# Patient Record
Sex: FEMALE | Race: WHITE | URBAN - METROPOLITAN AREA
[De-identification: names, ages, dates, MRNs, and addresses within clinical notes are randomized per-mention and may not be internally consistent; named-entity substitution may affect disease eponyms.]

---

## 2024-07-26 ENCOUNTER — TELEPHONE (OUTPATIENT)
Dept: PRIMARY CARE CLINIC | Age: 44
End: 2024-07-26

## 2024-07-26 ENCOUNTER — OFFICE VISIT (OUTPATIENT)
Dept: PRIMARY CARE CLINIC | Age: 44
End: 2024-07-26
Payer: COMMERCIAL

## 2024-07-26 VITALS
WEIGHT: 236 LBS | TEMPERATURE: 99.3 F | BODY MASS INDEX: 37.93 KG/M2 | DIASTOLIC BLOOD PRESSURE: 78 MMHG | SYSTOLIC BLOOD PRESSURE: 118 MMHG | HEART RATE: 82 BPM | OXYGEN SATURATION: 97 % | HEIGHT: 66 IN | RESPIRATION RATE: 19 BRPM

## 2024-07-26 DIAGNOSIS — Z23 IMMUNIZATION DUE: ICD-10-CM

## 2024-07-26 DIAGNOSIS — Z12.31 ENCOUNTER FOR SCREENING MAMMOGRAM FOR MALIGNANT NEOPLASM OF BREAST: ICD-10-CM

## 2024-07-26 DIAGNOSIS — Z11.4 ENCOUNTER FOR SCREENING FOR HIV: ICD-10-CM

## 2024-07-26 DIAGNOSIS — K31.84 GASTROPARESIS: ICD-10-CM

## 2024-07-26 DIAGNOSIS — J30.9 ALLERGIC RHINITIS, UNSPECIFIED SEASONALITY, UNSPECIFIED TRIGGER: ICD-10-CM

## 2024-07-26 DIAGNOSIS — Z11.59 NEED FOR HEPATITIS B SCREENING TEST: ICD-10-CM

## 2024-07-26 DIAGNOSIS — Z00.00 HEALTHCARE MAINTENANCE: Primary | ICD-10-CM

## 2024-07-26 DIAGNOSIS — Z11.59 ENCOUNTER FOR HEPATITIS C SCREENING TEST FOR LOW RISK PATIENT: ICD-10-CM

## 2024-07-26 DIAGNOSIS — N92.6 ENCOUNTER FOR MANAGEMENT OF MENSTRUAL ISSUE: ICD-10-CM

## 2024-07-26 DIAGNOSIS — E11.69 TYPE 2 DIABETES MELLITUS WITH OTHER SPECIFIED COMPLICATION, WITHOUT LONG-TERM CURRENT USE OF INSULIN (HCC): Primary | ICD-10-CM

## 2024-07-26 DIAGNOSIS — E11.9 TYPE 2 DIABETES MELLITUS WITHOUT COMPLICATION, WITHOUT LONG-TERM CURRENT USE OF INSULIN (HCC): ICD-10-CM

## 2024-07-26 PROCEDURE — 99386 PREV VISIT NEW AGE 40-64: CPT | Performed by: FAMILY MEDICINE

## 2024-07-26 PROCEDURE — 90715 TDAP VACCINE 7 YRS/> IM: CPT | Performed by: FAMILY MEDICINE

## 2024-07-26 PROCEDURE — 90471 IMMUNIZATION ADMIN: CPT | Performed by: FAMILY MEDICINE

## 2024-07-26 RX ORDER — PRUCALOPRIDE 2 MG/1
1 TABLET, FILM COATED ORAL DAILY
COMMUNITY
End: 2024-07-26 | Stop reason: SDUPTHER

## 2024-07-26 RX ORDER — LISINOPRIL 2.5 MG/1
2.5 TABLET ORAL DAILY
COMMUNITY
End: 2024-07-26 | Stop reason: SDUPTHER

## 2024-07-26 RX ORDER — DAPAGLIFLOZIN 10 MG/1
10 TABLET, FILM COATED ORAL EVERY MORNING
COMMUNITY
End: 2024-07-26 | Stop reason: SDUPTHER

## 2024-07-26 RX ORDER — PRUCALOPRIDE 2 MG/1
1 TABLET, FILM COATED ORAL DAILY
Qty: 90 TABLET | Refills: 0 | Status: SHIPPED | OUTPATIENT
Start: 2024-07-26 | End: 2024-10-24

## 2024-07-26 RX ORDER — DAPAGLIFLOZIN 10 MG/1
10 TABLET, FILM COATED ORAL EVERY MORNING
Qty: 90 TABLET | Refills: 0 | Status: SHIPPED | OUTPATIENT
Start: 2024-07-26

## 2024-07-26 RX ORDER — FEXOFENADINE HCL 60 MG/1
60 TABLET, FILM COATED ORAL DAILY
COMMUNITY
End: 2024-07-26 | Stop reason: SDUPTHER

## 2024-07-26 RX ORDER — LISINOPRIL 2.5 MG/1
2.5 TABLET ORAL DAILY
Qty: 90 TABLET | Refills: 0 | Status: SHIPPED | OUTPATIENT
Start: 2024-07-26

## 2024-07-26 RX ORDER — FEXOFENADINE HCL 60 MG/1
60 TABLET, FILM COATED ORAL DAILY
Qty: 90 TABLET | Refills: 0 | Status: SHIPPED | OUTPATIENT
Start: 2024-07-26 | End: 2024-10-24

## 2024-07-26 ASSESSMENT — ENCOUNTER SYMPTOMS
VOMITING: 0
BLOOD IN STOOL: 0
COUGH: 0
RHINORRHEA: 0
ABDOMINAL PAIN: 0
SHORTNESS OF BREATH: 0
DIARRHEA: 0
NAUSEA: 0
COLOR CHANGE: 0

## 2024-07-26 NOTE — TELEPHONE ENCOUNTER
Pt called stating she forgot to ask you for refills on all her medications.       metFORMIN (GLUCOPHAGE) 1000 MG tablet      dapagliflozin (FARXIGA) 10 MG tablet     lisinopril (PRINIVIL;ZESTRIL) 2.5 MG tablet     fexofenadine (ALLEGRA ALLERGY) 60 MG tablet     Prucalopride Succinate (MOTEGRITY) 2 MG TABS

## 2024-07-26 NOTE — ASSESSMENT & PLAN NOTE
Overall doing well. Counseled briefly and provided written materials on recommendations for exercise and healthy diet. Age appropriate screenings and immunization provided as per orders below. Reminded to schedule dental exam when able. Other problems addressed today as otherwise noted.

## 2024-07-26 NOTE — PATIENT INSTRUCTIONS
For help and support with [employer] reggie/portal set-up, please call [phone].     Zevia soda.    Reminder: Please call to schedule dental exam when able.    Lifestyle modifications discussed today:    Exercise:    In accordance with AHA/ACC guidelines:    - Get at least 150 minutes per week of moderate-intensity aerobic activity OR 75 minutes per week of vigorous aerobic activity, OR a combination of both, preferably spread throughout the week.    - Add moderate- to high-intensity muscle-strengthening activity (such as resistance or weights) on at least 2 days per week.    - Gain even more benefits by being active at least 300 minutes (5 hours) per week.    Increase amount and intensity gradually over time.    Diet:    Dietary pattern should consist of vegetables, fruits and whole grains, low-fat dairy (or no dairy if intolerant), suggested poultry and fish. Consider legumes, vegetable oils and nuts and limiting intake of sweets, sugar, sweetened beverages and red meat.    Aim is to have 5% of calories from saturated fats or less and no trans fat.

## 2024-07-26 NOTE — ASSESSMENT & PLAN NOTE
Well controlled. Please see plan and goals below:    Goals   [x] Glycemic control: A1c 7% (consider < 6.5% if long life expectancy, 7-8% if hypoglycemia risk or comorbidities, or 8-9% if poor life expectancy)  [x] Blood pressure control: BP < 140/90 (consider < 130/80 if high risk)  [x] Metformin at 2g/d  [ ] Moderate-high intensity statin (if ASCVD, or age > 40) - declined - counseled today  [ ] Smoking cessation - N/A     Labs  -Last HbA1c 6.4% today, next due Dec 2024 (q 3 months, or longer if at goal)  -Microalbumin due now and ordered (annually)  -Cr or eGFR due now and ordered (annually)  -Lipids due now and ordered (consider annually)  -LFT's due now and ordered (consider annually)  -B12 due can consider at Dec visit (annually if anemia or neuropathy or on Metformin)  -TSH due N/A (annually if T1DM)     Preventive Measures   [x] Diabetes education teaching (all new diagnoses)  [ ] Optometry/Ophthalmology referral (all pts w DM) - scheduled 8/8 at [facility], will send signed FRANCIE then - reminder set  [ ] PCV 20(all pts w DM) - declined today - counseled on this  [ ] Hep B 2-dose (all pts w DM if age < 60, risk/benefit discussion if age >60) - will test for immunity    [ ] dose 1    [ ] dose 2 four weeks after dose 1  [ ] Baby aspirin (if ASCVD, or age > 50) - will check lipids as above  [x] ACE/ARB (if CKD, or albumin-creatinine-ratio > 30)  -Annual comprehensive foot exam - Done today  -Annual eye exam - As above  -Annual flu shot - Not in season

## 2024-07-26 NOTE — TELEPHONE ENCOUNTER
Pt called stating her former GI was Dr Crawford  at [facility].   [phone]

## 2024-07-26 NOTE — PROGRESS NOTES
wheezing, rhonchi or rales.   Chest:      Chest wall: No tenderness.   Abdominal:      Palpations: Abdomen is soft.   Skin:     General: Skin is warm and dry.      Capillary Refill: Capillary refill takes less than 2 seconds.   Neurological:      Mental Status: She is alert.   Psychiatric:         Mood and Affect: Mood normal.         Behavior: Behavior normal.       Diabetic foot exam:   Left Foot:   Visual Exam: normal   Pulse DP: 2+ (normal)   Filament test: normal sensation     Right Foot:   Visual Exam: normal   Pulse DP: 2+ (normal)   Filament test: normal sensation     Body mass index is 38.38 kg/m².    Labs:  No results found for this or any previous visit (from the past 672 hour(s)).    Imaging:  ABHAY DIGITAL SCREEN W OR WO CAD BILATERAL    (Results Pending)     ASSESSMENT & PLAN:    Tatianna Calloway is a 44 y.o. year old female here for an annual exam. The following is a summary of the plan made at this visit:    1. Healthcare maintenance  Assessment & Plan:  Overall doing well. Counseled briefly and provided written materials on recommendations for exercise and healthy diet. Age appropriate screenings and immunization provided as per orders below. Reminded to schedule dental exam when able. Other problems addressed today as otherwise noted.    2. Encounter for screening mammogram for malignant neoplasm of breast  Assessment & Plan:  Age appropriate screening provided as per orders below.  Orders:  -     ABHAY DIGITAL SCREEN W OR WO CAD BILATERAL; Future  3. Encounter for hepatitis C screening test for low risk patient  Assessment & Plan:  Age appropriate screening provided as per orders below.  Orders:  -     Hepatitis C Antibody; Future  4. Encounter for screening for HIV  Assessment & Plan:  Age appropriate screening provided as per orders below.  Orders:  -     HIV Screen; Future  5. Need for hepatitis B screening test  Assessment & Plan:  Will check for immunity and offer Hepatitis B vaccine as

## 2024-08-02 DIAGNOSIS — Z11.59 NEED FOR HEPATITIS B SCREENING TEST: ICD-10-CM

## 2024-08-02 DIAGNOSIS — E11.9 TYPE 2 DIABETES MELLITUS WITHOUT COMPLICATION, WITHOUT LONG-TERM CURRENT USE OF INSULIN (HCC): ICD-10-CM

## 2024-08-02 DIAGNOSIS — Z11.59 ENCOUNTER FOR HEPATITIS C SCREENING TEST FOR LOW RISK PATIENT: ICD-10-CM

## 2024-08-02 DIAGNOSIS — Z11.4 ENCOUNTER FOR SCREENING FOR HIV: ICD-10-CM

## 2024-08-02 LAB
ALBUMIN SERPL-MCNC: 4.1 G/DL (ref 3.4–5)
ALBUMIN/GLOB SERPL: 1.3 {RATIO} (ref 1.1–2.2)
ALP SERPL-CCNC: 84 U/L (ref 40–129)
ALT SERPL-CCNC: 20 U/L (ref 10–40)
ANION GAP SERPL CALCULATED.3IONS-SCNC: 18 MMOL/L (ref 3–16)
AST SERPL-CCNC: 16 U/L (ref 15–37)
BILIRUB SERPL-MCNC: 0.3 MG/DL (ref 0–1)
BUN SERPL-MCNC: 10 MG/DL (ref 7–20)
CALCIUM SERPL-MCNC: 9.5 MG/DL (ref 8.3–10.6)
CHLORIDE SERPL-SCNC: 98 MMOL/L (ref 99–110)
CHOLEST SERPL-MCNC: 161 MG/DL (ref 0–199)
CO2 SERPL-SCNC: 21 MMOL/L (ref 21–32)
CREAT SERPL-MCNC: 0.7 MG/DL (ref 0.6–1.1)
CREAT UR-MCNC: 109.9 MG/DL (ref 28–259)
GFR SERPLBLD CREATININE-BSD FMLA CKD-EPI: >90 ML/MIN/{1.73_M2}
GLUCOSE SERPL-MCNC: 135 MG/DL (ref 70–99)
HBV SURFACE AB SERPL IA-ACNC: 6.75 MIU/ML
HCV AB SERPL QL IA: NORMAL
HDLC SERPL-MCNC: 73 MG/DL (ref 40–60)
LDLC SERPL CALC-MCNC: 72 MG/DL
MICROALBUMIN UR DL<=1MG/L-MCNC: <1.2 MG/DL
MICROALBUMIN/CREAT UR: NORMAL MG/G (ref 0–30)
POTASSIUM SERPL-SCNC: 4.2 MMOL/L (ref 3.5–5.1)
PROT SERPL-MCNC: 7.2 G/DL (ref 6.4–8.2)
SODIUM SERPL-SCNC: 137 MMOL/L (ref 136–145)
TRIGL SERPL-MCNC: 79 MG/DL (ref 0–150)
VLDLC SERPL CALC-MCNC: 16 MG/DL

## 2024-08-03 LAB
HIV 1+2 AB+HIV1 P24 AG SERPL QL IA: NORMAL
HIV 2 AB SERPL QL IA: NORMAL
HIV1 AB SERPL QL IA: NORMAL
HIV1 P24 AG SERPL QL IA: NORMAL

## 2024-08-05 ENCOUNTER — TELEPHONE (OUTPATIENT)
Dept: FAMILY MEDICINE CLINIC | Age: 44
End: 2024-08-05

## 2024-08-05 NOTE — TELEPHONE ENCOUNTER
SUBMITTED PA FOR Motegrity 2MG tablets  VIA CMM Key: [id number] STATUS NOT SENT TO PLAN. PLEASE PROVIDE A DX CODE FOR THE MEDICATION BEING REQUESTED.      If this requires a response please respond to the pool ( P MHCX PSC MEDICATION PRE-AUTH).      Thank you please advise patient.     FOLLOW UP DONE DAILY: IF NO RESPONSE IN 3 DAYS WE WILL REFAX FOR STATUS CHECK. IF ANOTHER 3 DAYS GOES BY WITH NO RESPONSE WILL CALL INSURANCE FOR STATUS.

## 2024-08-06 NOTE — TELEPHONE ENCOUNTER
SENT TO PLAN      If this requires a response please respond to the pool ( P MHCX PSC MEDICATION PRE-AUTH).      Thank you please advise patient.

## 2024-08-08 ENCOUNTER — TELEPHONE (OUTPATIENT)
Dept: PRIMARY CARE CLINIC | Age: 44
End: 2024-08-08

## 2024-08-08 NOTE — TELEPHONE ENCOUNTER
Called [facility] and spoke to Nannette Brunson said that the PA for Motegrity is still under review.  [facility] can take up to 15 days to complete a review.

## 2024-08-14 ENCOUNTER — OFFICE VISIT (OUTPATIENT)
Dept: GYNECOLOGY | Age: 44
End: 2024-08-14

## 2024-08-14 VITALS — SYSTOLIC BLOOD PRESSURE: 131 MMHG | HEART RATE: 88 BPM | OXYGEN SATURATION: 98 % | DIASTOLIC BLOOD PRESSURE: 90 MMHG

## 2024-08-14 DIAGNOSIS — N76.0 BV (BACTERIAL VAGINOSIS): ICD-10-CM

## 2024-08-14 DIAGNOSIS — B96.89 BV (BACTERIAL VAGINOSIS): ICD-10-CM

## 2024-08-14 DIAGNOSIS — N89.8 VAGINAL DISCHARGE: ICD-10-CM

## 2024-08-14 DIAGNOSIS — Z97.5 PRESENCE OF 52 MG LEVONORGESTREL-RELEASING INTRAUTERINE DEVICE (IUD): ICD-10-CM

## 2024-08-14 DIAGNOSIS — N92.6 CATAMENIAL DISORDER: ICD-10-CM

## 2024-08-14 DIAGNOSIS — Z01.419 WELL WOMAN EXAM WITH ROUTINE GYNECOLOGICAL EXAM: Primary | ICD-10-CM

## 2024-08-14 RX ORDER — PROMETHAZINE HYDROCHLORIDE 25 MG/1
TABLET ORAL
Qty: 2 TABLET | Refills: 0 | Status: SHIPPED | OUTPATIENT
Start: 2024-08-14

## 2024-08-14 RX ORDER — OXYCODONE HYDROCHLORIDE AND ACETAMINOPHEN 5; 325 MG/1; MG/1
2 TABLET ORAL ONCE
Qty: 2 TABLET | Refills: 0 | Status: SHIPPED | OUTPATIENT
Start: 2024-08-14 | End: 2024-08-14

## 2024-08-14 RX ORDER — ALPRAZOLAM 1 MG/1
TABLET ORAL
Qty: 1 TABLET | Refills: 0 | Status: SHIPPED | OUTPATIENT
Start: 2024-08-14 | End: 2024-08-14

## 2024-08-14 NOTE — TELEPHONE ENCOUNTER
Called and spoke with pt about the prior auth denial.  Pt sts she is not surprised about the denial and sts every insurance she has had denies it and the doctor has to do an appeal and either speak with someone at the insurance or write a letter of necessity.  Pt sts they usually deny her due to thinking she has IBS and not gastroparesis.  Pt sts she has tried Linzess in the past and another medication in that same class that she is unable to remember.  Pt sts those medications did not help and actually made her symptoms worse.  Pt sts she would appreciate if we could do an appeal and state that she has been on Motgerity for about 4 years and it is the only medication that has helped her gastroparesis.

## 2024-08-14 NOTE — PROGRESS NOTES
SUBJECTIVE:  Tatianna Calloway is an 44 y.o. year old woman who presents for annual gyn exam.    Has Mirena IUD.  It 6-7 years old.  She started to have spotting with it and like to get a new one.  She is using it for menstrual management.  She has been doing well with it.  We discussed removal and replacement.  Risks discussed including use and insertion.  Discussed placement with oral sedation.  Patient notes last time she had to have it removed it was uncomfortable so she is interested in some oral sedation.  Oral sedation was explained to her and that she would need to have someone she trusts take her to and from her appointment and that she would plan on napping afterwards.  She voices understanding and would like this.  Prescription will be sent today.    REVIEW OF SYSTEMS:  No complaints of symptoms involving:  Constitutional: there has been no unanticipated weight loss. There's been no change in activity level. Negative for fever, chills.  Eyes: No visual changes, double vision, or scotomata. No scleral icterus.  HENT: No Headaches, hearing loss or vertigo. No sore throat, ear pain or nasal congestion  Respiratory: no cough or wheezing, no sputum production, no hemoptysis.    Gastrointestinal: No abdominal pain, appetite loss, blood in stools. No change in bowel habits.  Genitourinary: No dysuria, trouble voiding, or hematuria. No change in bladder habits.  Musculoskeletal:  No gait disturbance,no weakness or joint complaints.  Skin: No rash or pruritis.  Neurological: No headache, vision changes, change in muscle strength, numbness or tingling. No change in gait, balance, coordination.  Psychiatric: No anxiety, or depression. No change in mood or behavior.  Endocrine: No temperature intolerance. No excessive thirst, fluid intake, or urination. No tremor.  Hematologic/Lymphatic: No abnormal bruising or bleeding, blood clots or swollen lymph nodes.       Patient Active Problem List   Diagnosis    Diabetes

## 2024-08-15 ENCOUNTER — LAB (OUTPATIENT)
Dept: PRIMARY CARE CLINIC | Age: 44
End: 2024-08-15

## 2024-08-15 VITALS — SYSTOLIC BLOOD PRESSURE: 130 MMHG | DIASTOLIC BLOOD PRESSURE: 80 MMHG

## 2024-08-15 LAB
CANDIDA DNA VAG QL NAA+PROBE: ABNORMAL
G VAGINALIS DNA SPEC QL NAA+PROBE: ABNORMAL
T VAGINALIS DNA VAG QL NAA+PROBE: ABNORMAL

## 2024-08-15 NOTE — TELEPHONE ENCOUNTER
IS THERE ANY ADDED INFORMATION THE DOCTOR WOULD LIKE TO ADD FOR THE APPEAL LETTER    If this requires a response please respond to the pool ( P MHCX PSC MEDICATION PRE-AUTH).      Thank you please advise patient.

## 2024-08-16 LAB
C TRACH DNA CVX QL NAA+PROBE: NEGATIVE
HPV HR 12 DNA SPEC QL NAA+PROBE: NOT DETECTED
HPV16 DNA SPEC QL NAA+PROBE: NOT DETECTED
HPV16+18+H RISK 12 DNA SPEC-IMP: NORMAL
HPV18 DNA SPEC QL NAA+PROBE: NOT DETECTED
N GONORRHOEA DNA SPEC QL NAA+PROBE: NEGATIVE

## 2024-08-16 RX ORDER — METRONIDAZOLE 500 MG/1
500 TABLET ORAL 2 TIMES DAILY
Qty: 14 TABLET | Refills: 0 | Status: SHIPPED | OUTPATIENT
Start: 2024-08-16 | End: 2024-08-23

## 2024-08-17 ENCOUNTER — HOSPITAL ENCOUNTER (OUTPATIENT)
Dept: MAMMOGRAPHY | Age: 44
Discharge: HOME | End: 2024-08-17
Attending: FAMILY MEDICINE
Payer: COMMERCIAL

## 2024-08-17 VITALS — BODY MASS INDEX: 37.93 KG/M2 | WEIGHT: 236 LBS | HEIGHT: 66 IN

## 2024-08-17 DIAGNOSIS — Z12.31 ENCOUNTER FOR SCREENING MAMMOGRAM FOR MALIGNANT NEOPLASM OF BREAST: ICD-10-CM

## 2024-08-17 PROCEDURE — 77063 BREAST TOMOSYNTHESIS BI: CPT

## 2024-08-20 NOTE — TELEPHONE ENCOUNTER
Noted    If this requires a response please respond to the pool ( P MHCX PSC MEDICATION PRE-AUTH).      Thank you please advise patient.

## 2024-08-25 PROBLEM — Z11.59 NEED FOR HEPATITIS B SCREENING TEST: Status: RESOLVED | Noted: 2024-07-26 | Resolved: 2024-08-25

## 2024-08-25 PROBLEM — Z11.59 ENCOUNTER FOR HEPATITIS C SCREENING TEST FOR LOW RISK PATIENT: Status: RESOLVED | Noted: 2024-07-26 | Resolved: 2024-08-25

## 2024-08-25 PROBLEM — Z00.00 HEALTHCARE MAINTENANCE: Status: RESOLVED | Noted: 2024-07-26 | Resolved: 2024-08-25

## 2024-08-25 PROBLEM — Z11.4 ENCOUNTER FOR SCREENING FOR HIV: Status: RESOLVED | Noted: 2024-07-26 | Resolved: 2024-08-25

## 2024-08-25 PROBLEM — Z12.31 ENCOUNTER FOR SCREENING MAMMOGRAM FOR MALIGNANT NEOPLASM OF BREAST: Status: RESOLVED | Noted: 2024-07-26 | Resolved: 2024-08-25

## 2024-08-29 ENCOUNTER — TELEPHONE (OUTPATIENT)
Dept: PRIMARY CARE CLINIC | Age: 44
End: 2024-08-29

## 2024-08-29 NOTE — TELEPHONE ENCOUNTER
[facility]     ----- Message from Dr. Rola Llanos MD sent at 8/29/2024  1:02 PM EDT -----  Routing to  to let the patient know her mammogram was normal.

## 2024-09-03 ENCOUNTER — PATIENT MESSAGE (OUTPATIENT)
Dept: GYNECOLOGY | Age: 44
End: 2024-09-03

## 2024-09-03 RX ORDER — FLUCONAZOLE 150 MG/1
TABLET ORAL
Qty: 2 TABLET | Refills: 0 | Status: SHIPPED | OUTPATIENT
Start: 2024-09-03

## 2024-09-03 NOTE — TELEPHONE ENCOUNTER
Future Appointments   Date Time Provider Department Center   12/4/2024  9:00 AM Rola Llanos MD [facility]   7/29/2025  9:00 AM Rola Llanos MD [facility]   [facility] 8/14/2024

## 2024-09-03 NOTE — TELEPHONE ENCOUNTER
The medication is APPROVED. LETTER AVAILABLE IN MEDIA  APPROVED 08/19/2024- 08/19/2025    If this requires a response please respond to the pool ( P MHCX PSC MEDICATION PRE-AUTH).      Thank you please advise patient.

## 2024-09-04 ENCOUNTER — TELEPHONE (OUTPATIENT)
Dept: PRIMARY CARE CLINIC | Age: 44
End: 2024-09-04

## 2024-09-10 ENCOUNTER — TELEPHONE (OUTPATIENT)
Dept: PRIMARY CARE CLINIC | Age: 44
End: 2024-09-10

## 2024-10-23 ENCOUNTER — TELEPHONE (OUTPATIENT)
Dept: GYNECOLOGY | Age: 44
End: 2024-10-23

## 2024-10-23 NOTE — TELEPHONE ENCOUNTER
Mirena IUD has been ordered to the [place] location. Pharmacy used is [facility]. They can be reached at [phone] or the direct birth control line is [phone]. Creating telephone encounter to track order. Order was scanned into the appt.

## 2024-11-06 ENCOUNTER — TELEPHONE (OUTPATIENT)
Dept: PRIMARY CARE CLINIC | Age: 44
End: 2024-11-06

## 2024-11-06 NOTE — TELEPHONE ENCOUNTER
----- Message from Dr. Rola Llanos MD sent at 11/5/2024 11:36 PM EST -----  Can you help me fax off her signed release of information - should be in the Pending/To do physical folder. Thank you!!!  ----- Message -----  From: Rola Llanos MD  Sent: 8/15/2024   7:00 AM EST  To: Rola Llanos MD    Send off FRANCIE for DM eye exam - signed and in folder.

## 2024-11-15 ENCOUNTER — PROCEDURE VISIT (OUTPATIENT)
Dept: GYNECOLOGY | Age: 44
End: 2024-11-15

## 2024-11-15 VITALS
WEIGHT: 232 LBS | HEART RATE: 95 BPM | OXYGEN SATURATION: 96 % | DIASTOLIC BLOOD PRESSURE: 82 MMHG | BODY MASS INDEX: 37.73 KG/M2 | SYSTOLIC BLOOD PRESSURE: 118 MMHG

## 2024-11-15 DIAGNOSIS — Z97.5 PRESENCE OF 52 MG LEVONORGESTREL-RELEASING INTRAUTERINE DEVICE (IUD): Primary | ICD-10-CM

## 2024-11-15 DIAGNOSIS — Z30.433 ENCOUNTER FOR REMOVAL AND REINSERTION OF INTRAUTERINE CONTRACEPTIVE DEVICE (IUD): ICD-10-CM

## 2024-11-15 LAB
CONTROL: PRESENT
PREGNANCY TEST URINE, POC: NEGATIVE

## 2024-11-15 NOTE — PROGRESS NOTES
The sensitive parts of the examination were performed with Homa Pitts MA as a chaperone.  Homa was present during the entirety of the sensitive parts of the examination.

## 2024-11-26 ENCOUNTER — OFFICE VISIT (OUTPATIENT)
Dept: GYNECOLOGY | Age: 44
End: 2024-11-26
Payer: COMMERCIAL

## 2024-11-26 VITALS — HEART RATE: 94 BPM | BODY MASS INDEX: 37.57 KG/M2 | OXYGEN SATURATION: 97 % | WEIGHT: 231 LBS

## 2024-11-26 DIAGNOSIS — Z97.5 PRESENCE OF INTRAUTERINE CONTRACEPTIVE DEVICE (IUD): Primary | ICD-10-CM

## 2024-11-26 PROCEDURE — 99213 OFFICE O/P EST LOW 20 MIN: CPT | Performed by: OBSTETRICS & GYNECOLOGY

## 2024-11-26 RX ORDER — LEVONORGESTREL 52 MG/1
1 INTRAUTERINE DEVICE INTRAUTERINE ONCE
COMMUNITY
Start: 2024-11-15

## 2024-11-26 NOTE — PROGRESS NOTES
Tatianna Calloway is a 44 y.o. female who presents for evaluation after insertion of IUD.  Tatianna verbalizes no complaints.     Tatianna denies any fever, abdominal or pelvic pain, nausea or vomiting.  She denies dysuria or changes in normal bowel habits.    She does report spotting and we again discussed what her bleeding pattern may be over the next 3-6 months.   She denies other problems or complaints.    Review of systems:  No complaints of symptoms involving:  Constitutional: negative for fever, chills.  Eyes: No change in vision, double vision, or scotomata.  HENT: No sore throat, ear pain or nasal congestion.  Respiratory: No cough, shortness of breath or hemoptysis.  Cardiovascular: No chest pain, orthopnea, fainting.  Skin: No pruritus or generalized rash.  Neurologic: No focal weakness or sensory changes.   Gynecologic: see HPI    ALLERGIES:  Patient has no known allergies.    OB History          3    Para   3    Term   3            AB        Living             SAB        IAB        Ectopic        Molar        Multiple        Live Births                    Past medical history, past surgical history, social history, and family history are updated in the electronic medical record and reviewed.    Past Medical History:   Diagnosis Date    Diabetes mellitus (HCC)     Diverticulosis     Gastroparesis     Obesity     Type 2 diabetes mellitus without complication (HCC)     Gestational that did not go away after delivery     Past Surgical History:   Procedure Laterality Date    CERVIX SURGERY      Cryosurgery for abnormal pap    CHOLECYSTECTOMY      WISDOM TOOTH EXTRACTION       Social History     Tobacco Use    Smoking status: Former     Current packs/day: 0.00     Average packs/day: 0.3 packs/day for 2.0 years (0.5 ttl pk-yrs)     Types: Cigarettes     Start date: 1998     Quit date: 2000     Years since quittin.9    Smokeless tobacco: Never   Substance Use Topics    Alcohol use: Yes

## 2024-12-04 ENCOUNTER — OFFICE VISIT (OUTPATIENT)
Dept: PRIMARY CARE CLINIC | Age: 44
End: 2024-12-04
Payer: COMMERCIAL

## 2024-12-04 VITALS
HEART RATE: 76 BPM | OXYGEN SATURATION: 98 % | WEIGHT: 233 LBS | SYSTOLIC BLOOD PRESSURE: 122 MMHG | DIASTOLIC BLOOD PRESSURE: 78 MMHG | BODY MASS INDEX: 37.45 KG/M2 | TEMPERATURE: 98.9 F | HEIGHT: 66 IN

## 2024-12-04 DIAGNOSIS — Z23 IMMUNIZATION DUE: ICD-10-CM

## 2024-12-04 DIAGNOSIS — L98.9 SKIN LESION: ICD-10-CM

## 2024-12-04 DIAGNOSIS — E11.69 TYPE 2 DIABETES MELLITUS WITH OTHER SPECIFIED COMPLICATION, WITHOUT LONG-TERM CURRENT USE OF INSULIN (HCC): Primary | ICD-10-CM

## 2024-12-04 LAB — HBA1C MFR BLD: 6.3 %

## 2024-12-04 PROCEDURE — 90471 IMMUNIZATION ADMIN: CPT | Performed by: FAMILY MEDICINE

## 2024-12-04 PROCEDURE — 3044F HG A1C LEVEL LT 7.0%: CPT | Performed by: FAMILY MEDICINE

## 2024-12-04 PROCEDURE — 83036 HEMOGLOBIN GLYCOSYLATED A1C: CPT | Performed by: FAMILY MEDICINE

## 2024-12-04 PROCEDURE — 90472 IMMUNIZATION ADMIN EACH ADD: CPT | Performed by: FAMILY MEDICINE

## 2024-12-04 PROCEDURE — 99214 OFFICE O/P EST MOD 30 MIN: CPT | Performed by: FAMILY MEDICINE

## 2024-12-04 PROCEDURE — 90677 PCV20 VACCINE IM: CPT | Performed by: FAMILY MEDICINE

## 2024-12-04 PROCEDURE — 90739 HEPB VACC 2/4 DOSE ADULT IM: CPT | Performed by: FAMILY MEDICINE

## 2024-12-04 RX ORDER — TRIAMCINOLONE ACETONIDE 0.25 MG/G
OINTMENT TOPICAL
Qty: 15 G | Refills: 1 | Status: SHIPPED | OUTPATIENT
Start: 2024-12-04 | End: 2024-12-11

## 2024-12-04 RX ORDER — DAPAGLIFLOZIN 10 MG/1
10 TABLET, FILM COATED ORAL EVERY MORNING
Qty: 90 TABLET | Refills: 0 | Status: SHIPPED | OUTPATIENT
Start: 2024-12-04

## 2024-12-04 RX ORDER — LISINOPRIL 2.5 MG/1
2.5 TABLET ORAL DAILY
Qty: 90 TABLET | Refills: 0 | Status: SHIPPED | OUTPATIENT
Start: 2024-12-04

## 2024-12-04 ASSESSMENT — ENCOUNTER SYMPTOMS
BLOOD IN STOOL: 0
RHINORRHEA: 0
SHORTNESS OF BREATH: 0
ABDOMINAL PAIN: 0
DIARRHEA: 0
COLOR CHANGE: 0
NAUSEA: 0
COUGH: 0
VOMITING: 0

## 2024-12-04 NOTE — PROGRESS NOTES
tenderness.   Skin:     General: Skin is warm and dry.      Capillary Refill: Capillary refill takes less than 2 seconds.      Comments: Back right - flat 1 cm round light brown stuck on appearance patch, dry skin overlying on dermoscopy   Neurological:      Mental Status: She is alert.   Psychiatric:         Mood and Affect: Mood normal.         Behavior: Behavior normal.       Body mass index is 37.9 kg/m².    Labs:  Recent Results (from the past 672 hour(s))   POCT urine pregnancy [POC7]    Collection Time: 11/15/24  8:55 AM   Result Value Ref Range    Preg Test, Ur Negative Negative    Control Present    POCT glycosylated hemoglobin (Hb A1C)    Collection Time: 12/04/24  9:28 AM   Result Value Ref Range    Hemoglobin A1C 6.3 %       Imaging:  No orders to display     ASSESSMENT & PLAN:    Tatianna Calloway is a 44 y.o. year old female here for Diabetes. The following is a summary of the plan made at this visit:    1. Type 2 diabetes mellitus with other specified complication, without long-term current use of insulin (HCC)  Assessment & Plan:  Well controlled. Please see plan and goals below:    Goals   [x] Glycemic control: A1c 7% (consider < 6.5% if long life expectancy, 7-8% if hypoglycemia risk or comorbidities, or 8-9% if poor life expectancy)  [x] Blood pressure control: BP < 140/90 (consider < 130/80 if high risk)  [x] Metformin at 2g/d  [ ] Moderate-high intensity statin (if ASCVD, or age > 40) - declined - counseled previously, she will consider  [ ] Smoking cessation - N/A     Labs  -Last HbA1c 6.3% today, next due July 2025 (q 3 months, or longer if at goal)  -Microalbumin due July 2025 (annually)  -Cr or eGFR due July 2025 (annually)  -Lipids due July 2025 (consider annually)  -LFT's due July 2025(consider annually)  -B12 due July 2025 (annually if anemia or neuropathy or on Metformin)  -TSH due N/A (annually if T1DM)     Preventive Measures   [x] Diabetes education teaching (all new diagnoses)  [ ]

## 2024-12-04 NOTE — ASSESSMENT & PLAN NOTE
Well controlled. Please see plan and goals below:    Goals   [x] Glycemic control: A1c 7% (consider < 6.5% if long life expectancy, 7-8% if hypoglycemia risk or comorbidities, or 8-9% if poor life expectancy)  [x] Blood pressure control: BP < 140/90 (consider < 130/80 if high risk)  [x] Metformin at 2g/d  [ ] Moderate-high intensity statin (if ASCVD, or age > 40) - declined - counseled previously, she will consider  [ ] Smoking cessation - N/A     Labs  -Last HbA1c 6.3% today, next due July 2025 (q 3 months, or longer if at goal)  -Microalbumin due July 2025 (annually)  -Cr or eGFR due July 2025 (annually)  -Lipids due July 2025 (consider annually)  -LFT's due July 2025(consider annually)  -B12 due July 2025 (annually if anemia or neuropathy or on Metformin)  -TSH due N/A (annually if T1DM)     Preventive Measures   [x] Diabetes education teaching (all new diagnoses)  [ ] Optometry/Ophthalmology referral (all pts w DM) - scheduled 12/27 at [facility], will get records - reminder set  [x] PCV 20(all pts w DM)   [ ] Hep B 2-dose (all pts w DM if age < 60, risk/benefit discussion if age >60) - will test for immunity    [x] dose 1    [ ] dose 2 four weeks after dose 1 - scheduled  [ ] Baby aspirin (if ASCVD, or age > 50) - will check lipids as above  [x] ACE/ARB (if CKD, or albumin-creatinine-ratio > 30)  -Annual comprehensive foot exam - UTD on this  -Annual eye exam - As above  -Annual flu shot - Reminder set to check in

## 2024-12-04 NOTE — ASSESSMENT & PLAN NOTE
Poorly controlled - some features of eczema as described in note above, ?SK also. Advised to switch to use steroid cream Rx sent in (per orders below) BID and Eucerin Original Healing Lotion OTC TID overlying. Call back/ED precautions discussed. Reminder set that if in 2 weeks not improved, she will f/u with Derm. She was amenable to this plan.

## 2024-12-04 NOTE — PATIENT INSTRUCTIONS
For Eczema:    - Please use Dove unscented soap  - Please use steroid cream twice daily  - Please use Eucerin Original Healing Lotion three times daily

## 2024-12-18 ENCOUNTER — TELEPHONE (OUTPATIENT)
Dept: PRIMARY CARE CLINIC | Age: 44
End: 2024-12-18

## 2024-12-18 NOTE — TELEPHONE ENCOUNTER
----- Message from Dr. Rola Llanos MD sent at 12/18/2024  3:14 PM EST -----  Help with this please? Thank you!  ----- Message -----  From: Rola Llanos MD  Sent: 12/18/2024   7:00 AM EST  To: Rola Llanos MD    Check in on spot on back if no improvement needs to see Derm please. Send it via [facility].

## 2025-01-02 ENCOUNTER — TELEPHONE (OUTPATIENT)
Dept: PRIMARY CARE CLINIC | Age: 45
End: 2025-01-02

## 2025-01-02 NOTE — TELEPHONE ENCOUNTER
----- Message from Dr. Rola Llanos MD sent at 12/30/2024  7:27 AM EST -----  Help with this please and thank you!  ----- Message -----  From: Rola Llanos MD  Sent: 12/30/2024   7:00 AM EST  To: Rola Llanos MD    Please go to [facility] to get eye exam for this patient (next door). Thank you!

## 2025-01-02 NOTE — TELEPHONE ENCOUNTER
Went to [facility] they do not have PT as a PT. Attempted to reach the PT, DAGOBERTO to return call to where he did get her eye exam.

## 2025-02-26 ENCOUNTER — OFFICE VISIT (OUTPATIENT)
Dept: GYNECOLOGY | Age: 45
End: 2025-02-26
Payer: COMMERCIAL

## 2025-02-26 VITALS
SYSTOLIC BLOOD PRESSURE: 140 MMHG | OXYGEN SATURATION: 98 % | HEART RATE: 105 BPM | WEIGHT: 233 LBS | DIASTOLIC BLOOD PRESSURE: 80 MMHG | BODY MASS INDEX: 37.9 KG/M2

## 2025-02-26 DIAGNOSIS — B37.2 SKIN YEAST INFECTION: Primary | ICD-10-CM

## 2025-02-26 PROCEDURE — 99214 OFFICE O/P EST MOD 30 MIN: CPT | Performed by: OBSTETRICS & GYNECOLOGY

## 2025-02-26 RX ORDER — CLOTRIMAZOLE AND BETAMETHASONE DIPROPIONATE 10; .64 MG/G; MG/G
CREAM TOPICAL
Qty: 45 G | Refills: 1 | Status: SHIPPED | OUTPATIENT
Start: 2025-02-26

## 2025-02-26 NOTE — PROGRESS NOTES
Chief complaint: Breast Problem (Right side red rased patch shown up about 8 weeks.  Now has a small spot on the left )    History of present illness: Tatianna Calloway 44 y.o. female No LMP recorded. (Menstrual status: IUD).  Who presents with complaint of small rash involving the right areola.  This been present for at least 8 weeks.  She has now developed a smaller one similarly on the left areola.  These areas are pruritic.  No nipple discharge.    Patient Active Problem List   Diagnosis    Diabetes mellitus (HCC)    Encounter for management of menstrual issue    Immunization due    Skin lesion       Review of systems:  No complaints of symptoms involving:  Constitutional: negative for fever, chills.  Eyes: No change in vision, double vision, or scotomata.  HENT: No sore throat, ear pain or nasal congestion.  Respiratory: No cough, shortness of breath or hemoptysis.  Cardiovascular: No chest pain, orthopnea, fainting.  Skin: No pruritus or generalized rash.  Neurologic: No focal weakness or sensory changes.      Past medical history, past surgical history, allergies, family history, and social history are all updated in the electronic medical record and reviewed.    Past Medical History:   Diagnosis Date    Diabetes mellitus (HCC)     Diverticulosis     Gastroparesis     Obesity     Type 2 diabetes mellitus without complication ([facility])     Gestational that did not go away after delivery     Past Surgical History:   Procedure Laterality Date    CERVIX SURGERY      Cryosurgery for abnormal pap    CHOLECYSTECTOMY      WISDOM TOOTH EXTRACTION       OB History          3    Para   3    Term   3            AB        Living             SAB        IAB        Ectopic        Molar        Multiple        Live Births                  Current Outpatient Medications on File Prior to Visit   Medication Sig Dispense Refill    dapagliflozin (FARXIGA) 10 MG tablet Take 1 tablet by mouth every morning 90 tablet 0

## 2025-02-26 NOTE — PROGRESS NOTES
The sensitive parts of the examination were performed with Peter Patel MA as a chaperone.  Peter was present during the entirety of the sensitive parts of the examination.

## 2025-03-02 DIAGNOSIS — E11.69 TYPE 2 DIABETES MELLITUS WITH OTHER SPECIFIED COMPLICATION, WITHOUT LONG-TERM CURRENT USE OF INSULIN (HCC): ICD-10-CM

## 2025-03-03 RX ORDER — LISINOPRIL 2.5 MG/1
2.5 TABLET ORAL DAILY
Qty: 90 TABLET | Refills: 0 | Status: SHIPPED | OUTPATIENT
Start: 2025-03-03

## 2025-03-03 RX ORDER — DAPAGLIFLOZIN 10 MG/1
10 TABLET, FILM COATED ORAL EVERY MORNING
Qty: 90 TABLET | Refills: 0 | Status: SHIPPED | OUTPATIENT
Start: 2025-03-03

## 2025-06-03 DIAGNOSIS — E11.69 TYPE 2 DIABETES MELLITUS WITH OTHER SPECIFIED COMPLICATION, WITHOUT LONG-TERM CURRENT USE OF INSULIN (HCC): ICD-10-CM

## 2025-06-03 RX ORDER — DAPAGLIFLOZIN 10 MG/1
10 TABLET, FILM COATED ORAL EVERY MORNING
Qty: 90 TABLET | Refills: 0 | Status: SHIPPED | OUTPATIENT
Start: 2025-06-03

## 2025-06-03 RX ORDER — LISINOPRIL 2.5 MG/1
2.5 TABLET ORAL DAILY
Qty: 90 TABLET | Refills: 0 | Status: SHIPPED | OUTPATIENT
Start: 2025-06-03

## 2025-06-03 NOTE — TELEPHONE ENCOUNTER
Please Advise    Medication:   Requested Prescriptions     Pending Prescriptions Disp Refills    FARXIGA 10 MG tablet [Pharmacy Med Name: FARXIGA 10MG TABLETS] 90 tablet 0     Sig: TAKE 1 TABLET BY MOUTH EVERY MORNING    metFORMIN (GLUCOPHAGE) 1000 MG tablet [Pharmacy Med Name: METFORMIN 1000MG TABLETS] 180 tablet 0     Sig: TAKE 1 TABLET BY MOUTH TWICE DAILY WITH MEALS        Last Filled: 3/3/2025      Patient Phone Number: [phone] (home)     Last appt: 12/4/2024   Next appt: 7/29/2025    Last OARRS:        No data to display